# Patient Record
(demographics unavailable — no encounter records)

---

## 2025-07-17 NOTE — HISTORY OF PRESENT ILLNESS
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  Alhaji was noted to have hypotonia at age 7 months, he had genetic testing around age 2-1/2-3.  His mutation which was only identified in 2017 presents with global developmental delay, hypotonia seizure s and autism.  Alhaji is followed at a specialized clinic at [facility].  There is apparently no growth issues associated with the syndrome.  Review of Alhaji's growth curve from the pediatrician indicates that he appears to be growing between the 50th to the 78th percentile from age to age 3 and then height appeared to follow-up to the 21st centile at age 4 BMI has been in general between 30 to 50%.  Of note, Alhaji is not very cooperative with measurements.  He was not standing at age two and the parents think that his first standing measurement was likely at his 4-year-old checkup  Alhaji had blood work performed by the pediatrician.  A bone age was normal for chronologic age 5, free T4 normal at 1 NG/DL, TSH normal at 2.055 UG/DL, negative celiac screening, negative celiac screening, normal CMP, and normal MRI of the brain  Alhaji had his first seizure at age 4, he is currently on medication and seizures are controlled.  He has a good appetite.  The parents do state that he seems to be a little bit more fatigued

## 2025-07-17 NOTE — PAST MEDICAL HISTORY
[ Section] : by  section [Speech & Motor Delay] : patient has speech and motor delay  [de-identified] : 8 lb 6 [FreeTextEntry4] : [id number] re0a  matre matrenal fever

## 2025-07-17 NOTE — PHYSICAL EXAM
[Healthy Appearing] : healthy appearing [Well Nourished] : well nourished [Interactive] : interactive [Normal Appearance] : normal appearance [Well formed] : well formed [Normally Set] : normally set [Normal S1 and S2] : normal S1 and S2 [Clear to Ausculation Bilaterally] : clear to auscultation bilaterally [Abdomen Soft] : soft [Abdomen Tenderness] : non-tender [] : no hepatosplenomegaly [Normal] : normal  [Murmur] : no murmurs [de-identified] : very ative, non verbal , narroe facies